# Patient Record
Sex: MALE | Race: OTHER | HISPANIC OR LATINO | Employment: OTHER | URBAN - METROPOLITAN AREA
[De-identification: names, ages, dates, MRNs, and addresses within clinical notes are randomized per-mention and may not be internally consistent; named-entity substitution may affect disease eponyms.]

---

## 2019-02-06 ENCOUNTER — CONSULT (OUTPATIENT)
Dept: URBAN - METROPOLITAN AREA CLINIC 39 | Facility: CLINIC | Age: 69
End: 2019-02-06

## 2019-02-06 DIAGNOSIS — H40.2233: ICD-10-CM

## 2019-02-06 DIAGNOSIS — H04.123: ICD-10-CM

## 2019-02-06 DIAGNOSIS — H25.813: ICD-10-CM

## 2019-02-06 PROCEDURE — 92014 COMPRE OPH EXAM EST PT 1/>: CPT

## 2019-02-06 PROCEDURE — 92020 GONIOSCOPY: CPT

## 2019-02-06 PROCEDURE — 76514 ECHO EXAM OF EYE THICKNESS: CPT

## 2019-02-06 RX ORDER — LATANOPROST 50 UG/ML: 1 SOLUTION/ DROPS OPHTHALMIC EVERY EVENING

## 2019-02-06 ASSESSMENT — TONOMETRY
OS_IOP_MMHG: 17
OD_IOP_MMHG: 15

## 2019-02-06 ASSESSMENT — PACHYMETRY
OS_CT_UM: 530
OD_CT_UM: 527

## 2019-02-06 ASSESSMENT — VISUAL ACUITY
OS_SC: J2
OS_SC: 20/25-1

## 2019-03-11 ENCOUNTER — IOP CHECK (OUTPATIENT)
Dept: URBAN - METROPOLITAN AREA CLINIC 39 | Facility: CLINIC | Age: 69
End: 2019-03-11

## 2019-03-11 DIAGNOSIS — H40.2233: ICD-10-CM

## 2019-03-11 DIAGNOSIS — H25.813: ICD-10-CM

## 2019-03-11 PROCEDURE — 92012 INTRM OPH EXAM EST PATIENT: CPT

## 2019-03-11 RX ORDER — DORZOLAMIDE 20 MG/ML: 1 SOLUTION/ DROPS OPHTHALMIC TWICE A DAY

## 2019-03-11 RX ORDER — BRIMONIDINE TARTRATE 2 MG/MG: 1 SOLUTION/ DROPS OPHTHALMIC TWICE A DAY

## 2019-03-11 ASSESSMENT — TONOMETRY
OD_IOP_MMHG: 11
OS_IOP_MMHG: 13

## 2019-03-11 ASSESSMENT — VISUAL ACUITY: OS_SC: 20/25-2

## 2019-06-24 ENCOUNTER — IOP CHECK (OUTPATIENT)
Dept: URBAN - METROPOLITAN AREA CLINIC 39 | Facility: CLINIC | Age: 69
End: 2019-06-24

## 2019-06-24 DIAGNOSIS — H25.813: ICD-10-CM

## 2019-06-24 DIAGNOSIS — H40.2233: ICD-10-CM

## 2019-06-24 PROCEDURE — 92012 INTRM OPH EXAM EST PATIENT: CPT

## 2019-06-24 ASSESSMENT — TONOMETRY
OS_IOP_MMHG: 15
OD_IOP_MMHG: 16

## 2019-06-24 ASSESSMENT — VISUAL ACUITY: OS_SC: 20/25-1

## 2019-09-30 ENCOUNTER — IOP CHECK (OUTPATIENT)
Dept: URBAN - METROPOLITAN AREA CLINIC 39 | Facility: CLINIC | Age: 69
End: 2019-09-30

## 2019-09-30 DIAGNOSIS — H04.123: ICD-10-CM

## 2019-09-30 DIAGNOSIS — H25.813: ICD-10-CM

## 2019-09-30 DIAGNOSIS — H40.2233: ICD-10-CM

## 2019-09-30 PROCEDURE — 92012 INTRM OPH EXAM EST PATIENT: CPT

## 2019-09-30 ASSESSMENT — VISUAL ACUITY: OS_SC: 20/30+1

## 2019-09-30 ASSESSMENT — TONOMETRY
OD_IOP_MMHG: 14
OS_IOP_MMHG: 15

## 2020-01-13 ENCOUNTER — IOP CHECK (OUTPATIENT)
Dept: URBAN - METROPOLITAN AREA CLINIC 39 | Facility: CLINIC | Age: 70
End: 2020-01-13

## 2020-01-13 DIAGNOSIS — H40.2233: ICD-10-CM

## 2020-01-13 PROCEDURE — 92012 INTRM OPH EXAM EST PATIENT: CPT

## 2020-01-13 ASSESSMENT — TONOMETRY
OS_IOP_MMHG: 14
OD_IOP_MMHG: 14

## 2020-01-13 ASSESSMENT — VISUAL ACUITY
OS_SC: 20/25-2
OS_AM: 20/25-1

## 2020-01-15 ENCOUNTER — CATARACT EVALUATION (OUTPATIENT)
Dept: URBAN - METROPOLITAN AREA CLINIC 39 | Facility: CLINIC | Age: 70
End: 2020-01-15

## 2020-01-15 DIAGNOSIS — H25.813: ICD-10-CM

## 2020-01-15 DIAGNOSIS — H40.2233: ICD-10-CM

## 2020-01-15 DIAGNOSIS — H04.123: ICD-10-CM

## 2020-01-15 PROCEDURE — 99499RLE REFRACTIVE CONSULT/RLE

## 2020-01-15 PROCEDURE — 92136TC INTERFEROMETRY - TECHNICAL COMPONENT

## 2020-01-15 ASSESSMENT — TONOMETRY
OD_IOP_MMHG: 12
OS_IOP_MMHG: 13

## 2020-01-15 ASSESSMENT — VISUAL ACUITY
OS_SC: J3
OS_BAT: 20/80
OS_AM: 20/25-1
OS_SC: 20/25-2

## 2020-03-02 ENCOUNTER — PRE-OP/H&P (OUTPATIENT)
Dept: URBAN - METROPOLITAN AREA CLINIC 39 | Facility: CLINIC | Age: 70
End: 2020-03-02

## 2020-03-02 ENCOUNTER — SURGERY/PROCEDURE (OUTPATIENT)
Dept: URBAN - METROPOLITAN AREA CLINIC 39 | Facility: CLINIC | Age: 70
End: 2020-03-02

## 2020-03-02 DIAGNOSIS — H40.2233: ICD-10-CM

## 2020-03-02 DIAGNOSIS — H25.811: ICD-10-CM

## 2020-03-02 DIAGNOSIS — H25.813: ICD-10-CM

## 2020-03-02 PROCEDURE — 99211HP H&P OFFICE/OUTPATIENT VISIT, EST

## 2020-03-02 PROCEDURE — 66999RB RLE BASIC VISION

## 2020-03-03 ENCOUNTER — CATARACT POST-OP 1-DAY (OUTPATIENT)
Dept: URBAN - METROPOLITAN AREA CLINIC 39 | Facility: CLINIC | Age: 70
End: 2020-03-03

## 2020-03-03 DIAGNOSIS — Z96.1: ICD-10-CM

## 2020-03-03 PROCEDURE — 99024 POSTOP FOLLOW-UP VISIT: CPT

## 2020-03-03 ASSESSMENT — TONOMETRY
OD_IOP_MMHG: 14
OD_IOP_MMHG: 31
OS_IOP_MMHG: 14
OD_IOP_MMHG: 28

## 2020-03-03 ASSESSMENT — VISUAL ACUITY: OD_SC: <J12

## 2020-03-06 ENCOUNTER — POST-OP CATARACT (OUTPATIENT)
Dept: URBAN - METROPOLITAN AREA CLINIC 39 | Facility: CLINIC | Age: 70
End: 2020-03-06

## 2020-03-06 DIAGNOSIS — Z96.1: ICD-10-CM

## 2020-03-06 PROCEDURE — 99024 POSTOP FOLLOW-UP VISIT: CPT

## 2020-03-06 ASSESSMENT — VISUAL ACUITY: OD_SC: <J12

## 2020-03-06 ASSESSMENT — TONOMETRY
OD_IOP_MMHG: 16
OS_IOP_MMHG: 14

## 2020-04-08 ENCOUNTER — POST-OP CATARACT (OUTPATIENT)
Dept: URBAN - METROPOLITAN AREA CLINIC 39 | Facility: CLINIC | Age: 70
End: 2020-04-08

## 2020-04-08 DIAGNOSIS — H40.2233: ICD-10-CM

## 2020-04-08 DIAGNOSIS — Z96.1: ICD-10-CM

## 2020-04-08 PROCEDURE — 99024 POSTOP FOLLOW-UP VISIT: CPT

## 2020-04-08 RX ORDER — BIMATOPROST 0.1 MG/ML
1 SOLUTION/ DROPS OPHTHALMIC EVERY EVENING
Start: 2020-04-08

## 2020-04-08 RX ORDER — BRINZOLAMIDE/BRIMONIDINE TARTRATE 10; 2 MG/ML; MG/ML
1 SUSPENSION/ DROPS OPHTHALMIC
Start: 2020-04-08

## 2020-04-08 ASSESSMENT — TONOMETRY
OD_IOP_MMHG: 21
OD_IOP_MMHG: 20
OS_IOP_MMHG: 28
OS_IOP_MMHG: 30

## 2020-04-08 ASSESSMENT — VISUAL ACUITY: OS_SC: 20/40+2

## 2020-04-13 ENCOUNTER — POST-OP CATARACT (OUTPATIENT)
Dept: URBAN - METROPOLITAN AREA CLINIC 39 | Facility: CLINIC | Age: 70
End: 2020-04-13

## 2020-04-13 DIAGNOSIS — Z96.1: ICD-10-CM

## 2020-04-13 DIAGNOSIS — H40.2233: ICD-10-CM

## 2020-04-13 PROCEDURE — 99024 POSTOP FOLLOW-UP VISIT: CPT

## 2020-04-13 ASSESSMENT — TONOMETRY
OS_IOP_MMHG: 17
OD_IOP_MMHG: 20

## 2020-04-13 ASSESSMENT — VISUAL ACUITY: OS_SC: 20/40+2

## 2020-05-27 ENCOUNTER — IOP CHECK (OUTPATIENT)
Dept: URBAN - METROPOLITAN AREA CLINIC 39 | Facility: CLINIC | Age: 70
End: 2020-05-27

## 2020-05-27 DIAGNOSIS — Z96.1: ICD-10-CM

## 2020-05-27 DIAGNOSIS — H40.2233: ICD-10-CM

## 2020-05-27 PROCEDURE — 99024 POSTOP FOLLOW-UP VISIT: CPT

## 2020-05-27 RX ORDER — DORZOLAMIDE HYDROCHLORIDE TIMOLOL MALEATE 20; 5 MG/ML; MG/ML: 1 SOLUTION/ DROPS OPHTHALMIC TWICE A DAY

## 2020-05-27 RX ORDER — LATANOPROST 50 UG/ML
1 SOLUTION/ DROPS OPHTHALMIC EVERY EVENING
Start: 2020-06-22

## 2020-05-27 ASSESSMENT — TONOMETRY
OD_IOP_MMHG: 12
OS_IOP_MMHG: 14

## 2020-05-27 ASSESSMENT — VISUAL ACUITY
OU_SC: 20/40+2
OS_SC: 20/40+2

## 2020-09-23 ENCOUNTER — IOP CHECK (OUTPATIENT)
Dept: URBAN - METROPOLITAN AREA CLINIC 39 | Facility: CLINIC | Age: 70
End: 2020-09-23

## 2020-09-23 DIAGNOSIS — H40.2233: ICD-10-CM

## 2020-09-23 PROCEDURE — 92083 EXTENDED VISUAL FIELD XM: CPT

## 2020-09-23 PROCEDURE — 99212 OFFICE O/P EST SF 10 MIN: CPT

## 2020-09-23 ASSESSMENT — TONOMETRY
OD_IOP_MMHG: 10
OS_IOP_MMHG: 11

## 2020-09-23 ASSESSMENT — VISUAL ACUITY
OU_SC: 20/30-2
OS_SC: 20/30-2

## 2020-11-11 ENCOUNTER — CATARACT EVALUATION (OUTPATIENT)
Dept: URBAN - METROPOLITAN AREA CLINIC 39 | Facility: CLINIC | Age: 70
End: 2020-11-11

## 2020-11-11 DIAGNOSIS — H26.491: ICD-10-CM

## 2020-11-11 DIAGNOSIS — H25.812: ICD-10-CM

## 2020-11-11 DIAGNOSIS — H40.2233: ICD-10-CM

## 2020-11-11 DIAGNOSIS — Z96.1: ICD-10-CM

## 2020-11-11 PROCEDURE — 99499RLE REFRACTIVE CONSULT/RLE

## 2020-11-11 RX ORDER — AMOXICILLIN 500 MG/1: 1 CAPSULE ORAL

## 2020-11-11 RX ORDER — PREDNISOLONE ACETATE 10 MG/ML: 1 SUSPENSION/ DROPS OPHTHALMIC

## 2020-11-11 ASSESSMENT — VISUAL ACUITY
OD_SC: >J12
OS_SC: J6
OS_BAT: 20/100
OS_SC: 20/30

## 2020-11-11 ASSESSMENT — TONOMETRY
OD_IOP_MMHG: 11
OS_IOP_MMHG: 13

## 2020-12-09 NOTE — PATIENT DISCUSSION
See scanned documents. Dx and treated with latanoprost qhs ou, and Dorz/av qam ou by OD in [place]. Continue drops.

## 2020-12-09 NOTE — PATIENT DISCUSSION
Baseline imaging taken today. Diffuse RNFL thinning ou. Fundus photos show disc drusen, crowded disc ou.

## 2020-12-09 NOTE — PATIENT DISCUSSION
Cataract(s) are not yet visually significant to the patient. Recommend monitoring, and patient agrees with this plan. Monitor.

## 2021-01-04 ENCOUNTER — PRE-OP/H&P (OUTPATIENT)
Dept: URBAN - METROPOLITAN AREA CLINIC 39 | Facility: CLINIC | Age: 71
End: 2021-01-04

## 2021-01-04 DIAGNOSIS — Z96.1: ICD-10-CM

## 2021-01-04 DIAGNOSIS — H26.491: ICD-10-CM

## 2021-01-04 DIAGNOSIS — H40.2233: ICD-10-CM

## 2021-01-04 DIAGNOSIS — H25.812: ICD-10-CM

## 2021-01-04 PROCEDURE — 99211HP H&P OFFICE/OUTPATIENT VISIT, EST

## 2021-01-05 ENCOUNTER — PREPPED CHART (OUTPATIENT)
Dept: URBAN - METROPOLITAN AREA CLINIC 39 | Facility: CLINIC | Age: 71
End: 2021-01-05

## 2021-01-05 DIAGNOSIS — Z96.1: ICD-10-CM

## 2021-01-05 PROCEDURE — 99024 POSTOP FOLLOW-UP VISIT: CPT

## 2021-03-11 NOTE — PATIENT DISCUSSION
IOP prev 13, 16/17 today. Advised still within acceptable range;monitor. IOP check 6 mths. MRI Contrast Discharge Instructions    The IV contrast you received today will pass out of your body in your  urine. This will happen in the next 24 hours. You will not feel this process.  Your urine will not change color.    Drink at least 4 extra glasses of water or juice today (unless your doctor  has restricted your fluids). This reduces the stress on your kidneys.  You may take your regular medicines.    If you are on dialysis: It is best to have dialysis today.    If you have a reaction: Most reactions happen right away. If you have  any new symptoms after leaving the hospital (such as hives or swelling),  call your hospital at the correct number below. Or call your family doctor.  If you have breathing distress or wheezing, call 911.    Special instructions: ***    I have read and understand the above information.    Signature:______________________________________ Date:___________    Staff:__________________________________________ Date:___________     Time:__________    [facility] Departments:    ___[place]: [phone]  ___[facility]: [phone]  ___[place]: [phone] ___[facility]: [phone]  ___[facility]: [phone]  ___[facility]: [phone]  ___[phone]  ___[facility]: [phone]  ___[phone]

## 2021-03-11 NOTE — PATIENT DISCUSSION
See scanned documents. Dx and treated with latanoprost qhs ou, and Dorz/av qam ou by OD in [place]. Continue drops. Rx refilled.

## 2021-09-20 NOTE — PATIENT DISCUSSION
IOP prev 13, stable at 16 ou today and all prev visits here. If IOP elevates in the future increase Dorz/av dosage to bid.

## 2021-12-20 NOTE — PATIENT DISCUSSION
IOP prev 13, stable today and all prev visits here. If IOP elevates in the future increase Dorz/av dosage to bid.

## 2022-11-21 NOTE — PATIENT DISCUSSION
RTC 6 months for Exam, glare and refraction if vision changes or vision complaints and ONH OCT, sooner if problems or changes.